# Patient Record
Sex: MALE | Race: WHITE | NOT HISPANIC OR LATINO | Employment: OTHER | URBAN - METROPOLITAN AREA
[De-identification: names, ages, dates, MRNs, and addresses within clinical notes are randomized per-mention and may not be internally consistent; named-entity substitution may affect disease eponyms.]

---

## 2019-07-23 ENCOUNTER — HOSPITAL ENCOUNTER (OUTPATIENT)
Dept: RADIOLOGY | Facility: MEDICAL CENTER | Age: 74
End: 2019-07-23

## 2019-07-24 ENCOUNTER — OFFICE VISIT (OUTPATIENT)
Dept: PULMONOLOGY | Facility: HOSPICE | Age: 74
End: 2019-07-24
Payer: MEDICARE

## 2019-07-24 VITALS
SYSTOLIC BLOOD PRESSURE: 90 MMHG | DIASTOLIC BLOOD PRESSURE: 60 MMHG | OXYGEN SATURATION: 93 % | RESPIRATION RATE: 16 BRPM | HEIGHT: 67 IN | HEART RATE: 54 BPM | BODY MASS INDEX: 32.49 KG/M2 | WEIGHT: 207 LBS | TEMPERATURE: 98.4 F

## 2019-07-24 DIAGNOSIS — R91.8 LUNG NODULES: ICD-10-CM

## 2019-07-24 PROCEDURE — 99204 OFFICE O/P NEW MOD 45 MIN: CPT | Performed by: INTERNAL MEDICINE

## 2019-07-24 RX ORDER — METOPROLOL SUCCINATE 50 MG/1
50 TABLET, EXTENDED RELEASE ORAL DAILY
Refills: 2 | COMMUNITY
Start: 2019-07-05

## 2019-07-24 RX ORDER — PAROXETINE HYDROCHLORIDE 20 MG/1
TABLET, FILM COATED ORAL
Refills: 3 | COMMUNITY
Start: 2019-07-05

## 2019-07-24 RX ORDER — LISINOPRIL 40 MG/1
TABLET ORAL
Refills: 2 | COMMUNITY
Start: 2019-07-05

## 2019-07-24 RX ORDER — OMEGA-3-ACID ETHYL ESTERS 1 G/1
CAPSULE, LIQUID FILLED ORAL
Refills: 2 | COMMUNITY
Start: 2019-07-05

## 2019-07-24 RX ORDER — ASPIRIN 325 MG
325 TABLET ORAL EVERY 6 HOURS PRN
COMMUNITY

## 2019-07-24 RX ORDER — FEXOFENADINE HCL 180 MG/1
TABLET ORAL
Refills: 3 | COMMUNITY
Start: 2019-07-05

## 2019-07-24 RX ORDER — LATANOPROST 50 UG/ML
SOLUTION/ DROPS OPHTHALMIC
Refills: 2 | COMMUNITY
Start: 2019-07-05

## 2019-07-24 RX ORDER — ROSUVASTATIN CALCIUM 20 MG/1
TABLET, COATED ORAL
Refills: 2 | COMMUNITY
Start: 2019-07-05

## 2019-07-24 ASSESSMENT — ENCOUNTER SYMPTOMS
WHEEZING: 0
WEAKNESS: 0
HEARTBURN: 0
MYALGIAS: 0
EYE DISCHARGE: 0
FALLS: 0
SHORTNESS OF BREATH: 0
PND: 0
FOCAL WEAKNESS: 0
HEMOPTYSIS: 0
CLAUDICATION: 0
ABDOMINAL PAIN: 0
DOUBLE VISION: 0
SPEECH CHANGE: 0
DIARRHEA: 0
NAUSEA: 0
SPUTUM PRODUCTION: 0
PHOTOPHOBIA: 0
CHILLS: 0
EYE PAIN: 0
SORE THROAT: 0
BLURRED VISION: 0
FEVER: 0
DIZZINESS: 0
TREMORS: 0
NECK PAIN: 0
SINUS PAIN: 0
ORTHOPNEA: 0
COUGH: 0
PALPITATIONS: 0
EYE REDNESS: 0
HEADACHES: 0
WEIGHT LOSS: 0
BACK PAIN: 0
DIAPHORESIS: 0
VOMITING: 0
CONSTIPATION: 0
STRIDOR: 0
DEPRESSION: 0

## 2019-07-24 NOTE — PROGRESS NOTES
Chief Complaint   Patient presents with   • Establish Care     referral 7/3/19 [facility] DX right middle lobe pulmonary nodule    • Results     CXR 7/1/19, Chest CT 7/2/19 [facility]        HPI: This patient is a 73 y.o. male presenting for evaluation of lung nodules.  The patient's past medical history significant for coronary artery disease status post coronary artery bypass graft roughly 24 years ago, major depressive disorder, hypertension, dyslipidemia.  The patient is a former tobacco user with less than 10-pack-year history and quit in 1980.  He drinks 1-2 drinks per month, no history of illicit drug use.  He did use chewing tobacco but quit this in 1980 as well.  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  He is retired for the past 25 years.  He denies travel other than to [place].  No pet birds at home or mold damage that the patient is aware of.  The patient was seen recently at [facility] near where he lives for acute onset amnesia.  There was concern that he was suffering a cerebrovascular accident and family took him to the emergency department where work-up included a chest x-ray.  He was kept overnight for evaluation including echocardiogram, MRI brain, bilateral carotid ultrasounds and observation.  The only abnormality found per wife was present with patient today was a left upper lobe infiltrate on chest x-ray.  The patient was asymptomatic from pulmonary standpoint however given abnormal finding a follow-up CT chest was ordered which showed a 2.4 x 1.3 irregularly-shaped left upper lobe masslike consolidation in addition to a 9 mm right upper lobe pleural-based medially located nodule.  There is also mildly enlarged bilateral hilar lymph nodes measuring up to 1.4 cm.  The patient denies cough, chest pain, fevers, chills, night sweats, weight loss.  He was  referred to us for diagnostic evaluation.    Past Medical History:   Diagnosis Date   • Chickenpox    • Coronary heart disease    • Depression    • Indonesian measles    • Heart attack (HCC)    • Mumps        Social History     Social History   • Marital status: Unknown     Spouse name: N/A   • Number of children: N/A   • Years of education: N/A     Occupational History   • Not on file.     Social History Main Topics   • Smoking status: Former Smoker     Packs/day: 0.50     Years: 10.00     Types: Cigarettes     Start date: 1970     Quit date: 1980   • Smokeless tobacco: Former User     Types: Chew     Quit date: 1980      Comment: tabacco chewer for 5 years    • Alcohol use Yes      Comment: occ   • Drug use: No   • Sexual activity: Not on file     Other Topics Concern   • Not on file     Social History Narrative   • No narrative on file       Family History   Problem Relation Age of Onset   • Cancer Mother    • No Known Problems Father    • Cancer Brother        No current outpatient prescriptions on file prior to visit.     No current facility-administered medications on file prior to visit.        Allergies: Patient has no known allergies.    ROS:   Review of Systems   Constitutional: Negative for chills, diaphoresis, fever, malaise/fatigue and weight loss.   HENT: Negative for congestion, ear discharge, ear pain, hearing loss, nosebleeds, sinus pain, sore throat and tinnitus.    Eyes: Negative for blurred vision, double vision, photophobia, pain, discharge and redness.   Respiratory: Negative for cough, hemoptysis, sputum production, shortness of breath, wheezing and stridor.    Cardiovascular: Negative for chest pain, palpitations, orthopnea, claudication, leg swelling and PND.   Gastrointestinal: Negative for abdominal pain, constipation, diarrhea, heartburn, nausea and vomiting.   Genitourinary: Negative for dysuria and urgency.   Musculoskeletal: Negative for back pain, falls, joint pain, myalgias and neck  "pain.   Skin: Negative for itching and rash.   Neurological: Negative for dizziness, tremors, speech change, focal weakness, weakness and headaches.   Endo/Heme/Allergies: Negative for environmental allergies.   Psychiatric/Behavioral: Negative for depression.       BP (!) 90/60 (BP Location: Left arm, Patient Position: Sitting, BP Cuff Size: Large adult)   Pulse (!) 54   Temp 36.9 °C (98.4 °F) (Temporal)   Resp 16   Ht 1.702 m (5' 7\")   Wt 93.9 kg (207 lb)   SpO2 93%     Physical Exam:  Physical Exam   Constitutional: He is oriented to person, place, and time. He appears well-developed and well-nourished. No distress.   HENT:   Head: Normocephalic and atraumatic.   Mouth/Throat: Oropharynx is clear and moist. No oropharyngeal exudate.   Eyes: Pupils are equal, round, and reactive to light. Conjunctivae and EOM are normal. No scleral icterus.   Neck: Normal range of motion. Neck supple. No tracheal deviation present.   Cardiovascular: Normal rate and regular rhythm.    Murmur heard.  II/VI systolic murmur LUSB   Pulmonary/Chest: Effort normal and breath sounds normal. No respiratory distress. He has no wheezes. He has no rales.   Abdominal: Soft.   Moderately obese   Musculoskeletal: Normal range of motion. He exhibits no edema or deformity.   Neurological: He is alert and oriented to person, place, and time. No cranial nerve deficit.   Skin: Skin is warm and dry. No rash noted.   Psychiatric: He has a normal mood and affect.       PFTs as reviewed by me personally: none    Imaging as reviewed by me personally: as per HPI    Assessment:  1. Lung nodules  [id number] BASE TO MID-THIGH    Bronchoscopy       Plan:  The size and characteristics w/associated LAD of UGO lesion is certainly concerning for primary lung Ca.  The RUL nodule is too medial for CT guided bx and the UGO nodule too central however I do believe it can be reached via navigational bronchoscopy and EBUS of hilar LNs can be done " simultaneously. I will order PET while we work to schedule diagnostic bx with bronch as detailed.  F/U with me pending scheduling above.   Return in about 4 weeks (around 8/21/2019) for lung nodule.

## 2019-07-25 ENCOUNTER — HOSPITAL ENCOUNTER (OUTPATIENT)
Dept: RADIOLOGY | Facility: MEDICAL CENTER | Age: 74
End: 2019-07-25
Attending: INTERNAL MEDICINE
Payer: MEDICARE

## 2019-07-25 ENCOUNTER — TELEPHONE (OUTPATIENT)
Dept: PULMONOLOGY | Facility: HOSPICE | Age: 74
End: 2019-07-25

## 2019-07-25 DIAGNOSIS — R91.8 LUNG NODULES: ICD-10-CM

## 2019-07-25 DIAGNOSIS — R91.1 LUNG NODULE: ICD-10-CM

## 2019-07-25 PROCEDURE — A9552 F18 FDG: HCPCS

## 2019-07-25 NOTE — TELEPHONE ENCOUNTER
[facility] gonzalez Reyna to call me back so we can go over dates for Veran + EBUS Bronchoscopy.  Dr. Connell is wanting me to try for august 8th or 9th. I will have to move patients for her this morning.   Waiting for authorization

## 2019-07-29 NOTE — TELEPHONE ENCOUNTER
Scheduled for 08/16/2019  Checking in at 1030 - CT scan will be at 11. Procedure will start at 1300.  Email sent to patient's wife with instructions

## 2019-08-15 DIAGNOSIS — Z01.810 PRE-OPERATIVE CARDIOVASCULAR EXAMINATION: ICD-10-CM

## 2019-08-15 DIAGNOSIS — Z01.812 PRE-OPERATIVE LABORATORY EXAMINATION: ICD-10-CM

## 2019-08-15 PROCEDURE — 80048 BASIC METABOLIC PNL TOTAL CA: CPT

## 2019-08-15 RX ORDER — GLUCOSAMINE SULFATE 500 MG
1 CAPSULE ORAL 2 TIMES DAILY
COMMUNITY

## 2019-08-15 RX ORDER — ZINC OXIDE 13 %
2 CREAM (GRAM) TOPICAL DAILY
COMMUNITY

## 2019-08-15 NOTE — OR NURSING
"Pre-admit appointment completed. \"Preparing for your procedure\" sheet given to pt along with verbal and written instructions. Pt instructed to continue regularly prescribed medications through the day before surgery. Pt instructed to take the following medications the day of surgery with a sip of water, per anesthesia protocol; metoprolol(if pt takes every am, he will verify at home)    MATHEUS education given due to score of 5 on MATHEUS screening tool.       "

## 2019-08-16 ENCOUNTER — APPOINTMENT (OUTPATIENT)
Dept: RADIOLOGY | Facility: MEDICAL CENTER | Age: 74
End: 2019-08-16
Attending: INTERNAL MEDICINE
Payer: MEDICARE

## 2019-08-16 ENCOUNTER — HOSPITAL ENCOUNTER (OUTPATIENT)
Facility: MEDICAL CENTER | Age: 74
End: 2019-08-16
Attending: INTERNAL MEDICINE | Admitting: INTERNAL MEDICINE
Payer: MEDICARE

## 2019-08-16 ENCOUNTER — ANESTHESIA (OUTPATIENT)
Dept: SURGERY | Facility: MEDICAL CENTER | Age: 74
End: 2019-08-16
Payer: MEDICARE

## 2019-08-16 ENCOUNTER — ANESTHESIA EVENT (OUTPATIENT)
Dept: SURGERY | Facility: MEDICAL CENTER | Age: 74
End: 2019-08-16
Payer: MEDICARE

## 2019-08-16 VITALS
SYSTOLIC BLOOD PRESSURE: 135 MMHG | OXYGEN SATURATION: 92 % | RESPIRATION RATE: 14 BRPM | HEART RATE: 59 BPM | WEIGHT: 210.54 LBS | DIASTOLIC BLOOD PRESSURE: 78 MMHG | BODY MASS INDEX: 33.04 KG/M2 | HEIGHT: 67 IN | TEMPERATURE: 97.7 F

## 2019-08-16 PROBLEM — I10 HYPERTENSION: Status: ACTIVE | Noted: 2019-08-16

## 2019-08-16 PROBLEM — I25.10 CORONARY HEART DISEASE: Status: ACTIVE | Noted: 2019-08-16

## 2019-08-16 PROBLEM — E78.00 HIGH CHOLESTEROL: Status: ACTIVE | Noted: 2019-08-16

## 2019-08-16 LAB
ANION GAP SERPL CALC-SCNC: 11 MMOL/L (ref 0–11.9)
BUN SERPL-MCNC: 16 MG/DL (ref 8–22)
CALCIUM SERPL-MCNC: 9.4 MG/DL (ref 8.5–10.5)
CHLORIDE SERPL-SCNC: 105 MMOL/L (ref 96–112)
CO2 SERPL-SCNC: 23 MMOL/L (ref 20–33)
CREAT SERPL-MCNC: 0.95 MG/DL (ref 0.5–1.4)
EKG IMPRESSION: NORMAL
GLUCOSE SERPL-MCNC: 102 MG/DL (ref 65–99)
PATHOLOGY CONSULT NOTE: NORMAL
PATHOLOGY CONSULT NOTE: NORMAL
POTASSIUM SERPL-SCNC: 4.1 MMOL/L (ref 3.6–5.5)
SODIUM SERPL-SCNC: 139 MMOL/L (ref 135–145)

## 2019-08-16 PROCEDURE — 160036 HCHG PACU - EA ADDL 30 MINS PHASE I: Performed by: INTERNAL MEDICINE

## 2019-08-16 PROCEDURE — 160002 HCHG RECOVERY MINUTES (STAT): Performed by: INTERNAL MEDICINE

## 2019-08-16 PROCEDURE — 88305 TISSUE EXAM BY PATHOLOGIST: CPT

## 2019-08-16 PROCEDURE — 160048 HCHG OR STATISTICAL LEVEL 1-5: Performed by: INTERNAL MEDICINE

## 2019-08-16 PROCEDURE — 700111 HCHG RX REV CODE 636 W/ 250 OVERRIDE (IP): Performed by: STUDENT IN AN ORGANIZED HEALTH CARE EDUCATION/TRAINING PROGRAM

## 2019-08-16 PROCEDURE — 700101 HCHG RX REV CODE 250: Performed by: STUDENT IN AN ORGANIZED HEALTH CARE EDUCATION/TRAINING PROGRAM

## 2019-08-16 PROCEDURE — 88172 CYTP DX EVAL FNA 1ST EA SITE: CPT | Mod: 91

## 2019-08-16 PROCEDURE — 93010 ELECTROCARDIOGRAM REPORT: CPT | Performed by: INTERNAL MEDICINE

## 2019-08-16 PROCEDURE — 71250 CT THORAX DX C-: CPT

## 2019-08-16 PROCEDURE — 31628 BRONCHOSCOPY/LUNG BX EACH: CPT | Performed by: INTERNAL MEDICINE

## 2019-08-16 PROCEDURE — 160025 RECOVERY II MINUTES (STATS): Performed by: INTERNAL MEDICINE

## 2019-08-16 PROCEDURE — 700105 HCHG RX REV CODE 258: Performed by: INTERNAL MEDICINE

## 2019-08-16 PROCEDURE — 160041 HCHG SURGERY MINUTES - EA ADDL 1 MIN LEVEL 4: Performed by: INTERNAL MEDICINE

## 2019-08-16 PROCEDURE — 302978 HCHG BRONCHOSCOPY-DIAGNOSTIC

## 2019-08-16 PROCEDURE — 31627 NAVIGATIONAL BRONCHOSCOPY: CPT | Performed by: INTERNAL MEDICINE

## 2019-08-16 PROCEDURE — 93005 ELECTROCARDIOGRAM TRACING: CPT | Performed by: INTERNAL MEDICINE

## 2019-08-16 PROCEDURE — 71045 X-RAY EXAM CHEST 1 VIEW: CPT

## 2019-08-16 PROCEDURE — 700101 HCHG RX REV CODE 250: Performed by: INTERNAL MEDICINE

## 2019-08-16 PROCEDURE — 88177 CYTP FNA EVAL EA ADDL: CPT | Mod: 91

## 2019-08-16 PROCEDURE — 88173 CYTOPATH EVAL FNA REPORT: CPT

## 2019-08-16 PROCEDURE — 160035 HCHG PACU - 1ST 60 MINS PHASE I: Performed by: INTERNAL MEDICINE

## 2019-08-16 PROCEDURE — 160029 HCHG SURGERY MINUTES - 1ST 30 MINS LEVEL 4: Performed by: INTERNAL MEDICINE

## 2019-08-16 PROCEDURE — 160009 HCHG ANES TIME/MIN: Performed by: INTERNAL MEDICINE

## 2019-08-16 PROCEDURE — 160046 HCHG PACU - 1ST 60 MINS PHASE II: Performed by: INTERNAL MEDICINE

## 2019-08-16 RX ORDER — OXYCODONE HCL 5 MG/5 ML
5 SOLUTION, ORAL ORAL
Status: DISCONTINUED | OUTPATIENT
Start: 2019-08-16 | End: 2019-08-16 | Stop reason: HOSPADM

## 2019-08-16 RX ORDER — SODIUM CHLORIDE, SODIUM LACTATE, POTASSIUM CHLORIDE, CALCIUM CHLORIDE 600; 310; 30; 20 MG/100ML; MG/100ML; MG/100ML; MG/100ML
INJECTION, SOLUTION INTRAVENOUS CONTINUOUS
Status: DISCONTINUED | OUTPATIENT
Start: 2019-08-16 | End: 2019-08-16 | Stop reason: HOSPADM

## 2019-08-16 RX ORDER — MEPERIDINE HYDROCHLORIDE 25 MG/ML
12.5 INJECTION INTRAMUSCULAR; INTRAVENOUS; SUBCUTANEOUS
Status: DISCONTINUED | OUTPATIENT
Start: 2019-08-16 | End: 2019-08-16 | Stop reason: HOSPADM

## 2019-08-16 RX ORDER — OXYCODONE HCL 5 MG/5 ML
10 SOLUTION, ORAL ORAL
Status: DISCONTINUED | OUTPATIENT
Start: 2019-08-16 | End: 2019-08-16 | Stop reason: HOSPADM

## 2019-08-16 RX ORDER — ONDANSETRON 2 MG/ML
4 INJECTION INTRAMUSCULAR; INTRAVENOUS
Status: DISCONTINUED | OUTPATIENT
Start: 2019-08-16 | End: 2019-08-16 | Stop reason: HOSPADM

## 2019-08-16 RX ORDER — LIDOCAINE HYDROCHLORIDE 20 MG/ML
JELLY TOPICAL PRN
Status: DISCONTINUED | OUTPATIENT
Start: 2019-08-16 | End: 2019-08-16 | Stop reason: SURG

## 2019-08-16 RX ORDER — HYDROMORPHONE HYDROCHLORIDE 2 MG/ML
0.4 INJECTION, SOLUTION INTRAMUSCULAR; INTRAVENOUS; SUBCUTANEOUS
Status: DISCONTINUED | OUTPATIENT
Start: 2019-08-16 | End: 2019-08-16 | Stop reason: HOSPADM

## 2019-08-16 RX ORDER — HALOPERIDOL 5 MG/ML
1 INJECTION INTRAMUSCULAR
Status: DISCONTINUED | OUTPATIENT
Start: 2019-08-16 | End: 2019-08-16 | Stop reason: HOSPADM

## 2019-08-16 RX ORDER — ONDANSETRON 2 MG/ML
INJECTION INTRAMUSCULAR; INTRAVENOUS PRN
Status: DISCONTINUED | OUTPATIENT
Start: 2019-08-16 | End: 2019-08-16 | Stop reason: SURG

## 2019-08-16 RX ORDER — HYDROMORPHONE HYDROCHLORIDE 2 MG/ML
0.1 INJECTION, SOLUTION INTRAMUSCULAR; INTRAVENOUS; SUBCUTANEOUS
Status: DISCONTINUED | OUTPATIENT
Start: 2019-08-16 | End: 2019-08-16 | Stop reason: HOSPADM

## 2019-08-16 RX ORDER — HYDROMORPHONE HYDROCHLORIDE 2 MG/ML
0.2 INJECTION, SOLUTION INTRAMUSCULAR; INTRAVENOUS; SUBCUTANEOUS
Status: DISCONTINUED | OUTPATIENT
Start: 2019-08-16 | End: 2019-08-16 | Stop reason: HOSPADM

## 2019-08-16 RX ADMIN — LIDOCAINE HYDROCHLORIDE 5 ML: 20 JELLY TOPICAL at 13:09

## 2019-08-16 RX ADMIN — ONDANSETRON 4 MG: 2 INJECTION INTRAMUSCULAR; INTRAVENOUS at 14:13

## 2019-08-16 RX ADMIN — LIDOCAINE HYDROCHLORIDE 0.5 ML: 10 INJECTION, SOLUTION INFILTRATION; PERINEURAL at 11:02

## 2019-08-16 RX ADMIN — SUGAMMADEX 200 MG: 100 INJECTION, SOLUTION INTRAVENOUS at 14:10

## 2019-08-16 RX ADMIN — EPHEDRINE SULFATE 10 MG: 50 INJECTION INTRAMUSCULAR; INTRAVENOUS; SUBCUTANEOUS at 14:06

## 2019-08-16 RX ADMIN — SUCCINYLCHOLINE CHLORIDE 100 MG: 20 INJECTION, SOLUTION INTRAMUSCULAR; INTRAVENOUS at 13:09

## 2019-08-16 RX ADMIN — SODIUM CHLORIDE, POTASSIUM CHLORIDE, SODIUM LACTATE AND CALCIUM CHLORIDE: 600; 310; 30; 20 INJECTION, SOLUTION INTRAVENOUS at 11:02

## 2019-08-16 RX ADMIN — ROCURONIUM BROMIDE 5 MG: 10 INJECTION INTRAVENOUS at 13:09

## 2019-08-16 RX ADMIN — FENTANYL CITRATE 100 MCG: 50 INJECTION, SOLUTION INTRAMUSCULAR; INTRAVENOUS at 13:09

## 2019-08-16 RX ADMIN — PROPOFOL 200 MG: 10 INJECTION, EMULSION INTRAVENOUS at 13:09

## 2019-08-16 NOTE — OR NURSING
1418 To PACU from special procedures room  via gurney, sleeping, respirations spontaneous and non-labored via OPA. OPA dc'd upon arrival by Dr. Perez. Pts VSS. BP elevated, Dr. Perez aware.  1430 Pts VSS. Pt waking up more at this time    1445 Updated pts wife via phone. Pt denies pain and nausea at this time. VSS  1455 X ray at bedside for chest x ray.   1500 Pt denies pain and nausea. VSS. Pt A&O. Awaiting chest x ray to be read to discharge pt   1515 No change   1530 Pt meets criteria for stage two. VSS. Pt denies pain and nausea

## 2019-08-16 NOTE — DISCHARGE INSTRUCTIONS
Bronchoscopy Discharge Instructions  Home Care Instructions    ACTIVITY: Rest and take it easy for the first 24 hours.  A responsible adult is recommended to remain with you during that time.  It is normal to feel sleepy.  We encourage you to not do anything that requires balance, judgment or coordination.    The medicine you had during the bronchoscopy will make you sleepy.    FOR 24 HOURS DO NOT:  Drive, operate machinery or run household appliances.  Drink beer or alcoholic beverages.  Make important decisions or sign legal documents.  Engage in activity that requires sharp judgment and reflexes for 24 hours    SPECIAL INSTRUCTIONS: Call you doctor and go to the ER if you are coughing up more than 2 tablespoons of bright red blood. -Call your doctor and go to the ER if you experience acute onset of shortness of breath and/or increased chest pain. -Call your doctor and go to the ER if you develop a fever greater than 101.5 degrees Fahrenheit.    Bronchoscopy is a procedure to look inside your windpipe and bronchial tubes.  An anesthetic solution is sprayed in your throat to make it numb.    You may experience a mild sore throat, hoarseness, fever up to 101?F, and /or coughing up small amounts of blood immediately following your bronchoscopy, especially if a biopsy was performed.  The discomfort should subside in 24-48 hours.    Do not smoke for 6-8 hours after the procedure to decrease your risk of coughing and /or bleeding.    Do not drink fluids or eat until your gag reflex returns, for two hours after the bronchoscopy.  Otherwise you will not feel the food or fluid in your throat, and it may go down your windpipe and cause you to choke.    Take ice chips or slowly sip cool fluids to make sure your gag reflex has returned.  Avoid hot fluids from the microwave for several hours.    After 2 hours or when you get home you may take throat lozenges or gargle with salt water if your throat is sore.  Drink  liquids to help dryness in your mouth and throat.    Resume your normal activities the following day.    MEDICATIONS: Resume taking daily medication as directed by your doctor.  Other Medications: None     A follow-up appointment should be arranged with your doctor in 1 week to get the results of the bronchoscopy and any tests done during the procedure; call to schedule.      You should CALL YOUR PHYSICIAN if you develop:  Fever greater than 101?F  You cough up more than a teaspoon of blood other than blood-tinged mucus  You have increasing amounts of bleeding from coughing after the bronchoscopy  You are wheezing  You develop any unusual signs or symptoms or have any questions                You should call 911 if you develop problems with breathing or chest pain.    If you are unable to contact your doctor or surgical center, you should go to the nearest emergency room or urgent care center.      Physician's telephone #: Dr. Connell [phone]      If any questions arise, call your doctor.  If your doctor is not available, please feel free to call the Surgical Center at [phone].  The Center is open Monday through Friday from 7AM to 7PM.  You can also call the [employer] HOTLINE open 24 hours/day, 7 days/week and speak to a nurse at [phone], or toll free at [phone].    You may receive a survey in the mail within the next two weeks and we ask that you take a few moments to complete the survey and return it to us.  Our goal is to provide you with very good care and we value your comments.

## 2019-08-16 NOTE — ANESTHESIA QCDR
2019 [facility] Clinical Data Registry (for Quality Improvement)     Postoperative nausea/vomiting risk protocol (Adult = 18 yrs and Pediatric 3-17 yrs)- (430 and 463)  General inhalation anesthetic (NOT TIVA) with PONV risk factors: Yes  Provision of anti-emetic therapy with at least 2 different classes of agents: No   Patient DID NOT receive anti-emetic therapy and reason is documented in Medical Record:        Multimodal Pain Management- (AQI59)  Patient undergoing Elective Surgery (i.e. Outpatient, or ASC, or Prescheduled Surgery prior to Hospital Admission): Yes  Use of Multimodal Pain Management, two or more drugs and/or interventions, NOT including systemic opioids: No   Exception: Documented allergy to multiple classes of analgesics: No        PACU assessment of acute postoperative pain prior to Anesthesia Care End- Applies to Patients Age = 18- (ABG7)  Initial PACU pain score is which of the following: < 7/10  Patient unable to report pain score: N/A    Post-anesthetic transfer of care checklist/protocol to PACU/ICU- (426 and 427)  Upon conclusion of case, patient transferred to which of the following locations: PACU/Non-ICU  Use of transfer checklist/protocol: Yes  Exclusion: Service Performed in Patient Hospital Room (and thus did not require transfer): N/A    PACU Reintubation- (AQI31)  General anesthesia requiring endotracheal intubation (ETT) along with subsequent extubation in OR or PACU: Yes  Required reintubation in the PACU: No   Extubation was a planned trial documented in the medical record prior to removal of the original airway device:  N/A    Unplanned admission to ICU related to anesthesia service up through end of PACU care- (MD51)  Unplanned admission to ICU (not initially anticipated at anesthesia start time): No

## 2019-08-16 NOTE — OR NURSING
1635-Pt arrived STG2 recovery. Assisted to dress and into recliner. Tolerated well. Denies pain or nausea at present. VSS.   Family to chairside.    D/c instructions given with good understanding.     NAD  Pt d/cd to family via WC.    Pt ambulatory to BR, + U/O.

## 2019-08-16 NOTE — PROCEDURES
Bronchoscopy    Indication: UGO lesion    Informed consent obtained including and not limited to pneumothorax, bleeding, infection, arrythmias, cardiac arrest and non diagnostic procedure    Medication: See Dr. Perez's note re: general anesthesia    Time out obtained before beginning of procedure    Procedure:Navigational bronchoscopy    Bronchoscope was passed via ET tube  Vocal cords: did not viaualize  Ania:sharp  Trachea:normal  UGO, lingula, LLL, RUL, RML, RLL all level one subsegments visualized no endobronchial lesions seen  Via navigational bronchoscopy 6 forcep bx done and 3 brush samplings done  Brush slide suggestive of atypical mitotic cells    EBL < 5 ml    No acute complications    CXR pending      Impression  UGO lesion 2.1 cm with SUV of 2.5  R/o maligancy   Awaiting final

## 2019-08-16 NOTE — ANESTHESIA TIME REPORT
Anesthesia Start and Stop Event Times     Date Time Event    8/16/2019 1250 Ready for Procedure     1300 Anesthesia Start     1419 Anesthesia Stop        Responsible Staff  08/16/19    Name Role Begin End    Kirt Perez M.D. Anesth 1300 1419        Preop Diagnosis (Free Text):  Pre-op Diagnosis     LUNG NODULES        Preop Diagnosis (Codes):  Diagnosis Information     Diagnosis Code(s): Lung nodules [R91.8]        Post op Diagnosis  Lung nodule      Premium Reason  Non-Premium    Comments:

## 2019-08-16 NOTE — ANESTHESIA POSTPROCEDURE EVALUATION
Patient: Axel Cuevas    Procedure Summary     Date:  08/16/19 Room / Location:   PROCEDURE ROOM / SURGERY [facility]    Anesthesia Start:  1300 Anesthesia Stop:  1419    Procedures:       BRONCHOSCOPY - FIBEROPTIC, POSS WASH, BRUSH, BRONCHOALVEOLAR LAVAGE, BIOPSY, FNA      ENDOBRONCHIAL ULTRASOUND (EBUS) - AHSAN Diagnosis:       Lung nodules      (LUNG NODULES)    Surgeon:  Marco A Arthur M.D. Responsible Provider:  Kirt Perez M.D.    Anesthesia Type:  general ASA Status:  3          Final Anesthesia Type: general  Last vitals  BP   Blood Pressure : 135/78    Temp   36.5 °C (97.7 °F)    Pulse   Pulse: (!) 59, Heart Rate (Monitored): (!) 58   Resp   14    SpO2   92 %      Anesthesia Post Evaluation    Patient location during evaluation: PACU  Patient participation: complete - patient participated  Level of consciousness: awake and alert    Airway patency: patent  Anesthetic complications: no  Cardiovascular status: hemodynamically stable  Respiratory status: acceptable  Hydration status: euvolemic    PONV: none           Nurse Pain Score: 0 (NPRS)

## 2019-08-16 NOTE — ANESTHESIA PREPROCEDURE EVALUATION
Relevant Problems   CARDIAC   (+) Coronary heart disease   (+) Heart attack (HCC)   (+) Hypertension       Physical Exam    Airway   Mallampati: II  TM distance: >3 FB  Neck ROM: full       Cardiovascular - normal exam  Rhythm: regular  Rate: normal  (-) murmur     Dental - normal exam         Pulmonary - normal exam  Breath sounds clear to auscultation     Abdominal    Neurological - normal exam                 Anesthesia Plan    ASA 3   ASA physical status 3 criteria: CAD/stents (> 3 months)    Plan - general       Airway plan will be ETT        Induction: intravenous    Postoperative Plan: Postoperative administration of opioids is intended.    Pertinent diagnostic labs and testing reviewed    Informed Consent:    Anesthetic plan and risks discussed with patient.    Use of blood products discussed with: patient whom consented to blood products.

## 2019-08-16 NOTE — OR NURSING
Patient to preop, allergies and NPO status verified, home medications reconciled, belongings secured, verbalizes understanding of pain scale, surgical site verified, IV access established, nasal and oral of triple aim completed.  1100: Patient taken to CT.  1138: Back from CT.

## 2019-08-16 NOTE — ANESTHESIA PROCEDURE NOTES
Airway  Date/Time: 8/16/2019 1:13 PM  Performed by: Kirt Perez M.D.  Authorized by: Kirt Perez M.D.     Location:  OR  Urgency:  Elective  Indications for Airway Management:  Anesthesia  Spontaneous Ventilation: absent    Sedation Level:  Deep  Preoxygenated: Yes    Patient Position:  Sniffing  Final Airway Type:  Endotracheal airway  Final Endotracheal Airway:  ETT  Cuffed: Yes    Technique Used for Successful ETT Placement:  Direct laryngoscopy  Insertion Site:  Oral  Blade Type:  Alverto  Laryngoscope Blade/Videolaryngoscope Blade Size:  4  ETT Size (mm):  8.5  Measured from:  Teeth  ETT to Teeth (cm):  24  Placement Verified by: auscultation and capnometry    Cormack-Lehane Classification:  Grade I - full view of glottis  Number of Attempts at Approach:  2   Attempt 1 with Mac4, grade III view, switched to glidescope, ATI.  Easy mask.

## 2019-08-20 ENCOUNTER — TELEPHONE (OUTPATIENT)
Dept: PULMONOLOGY | Facility: HOSPICE | Age: 74
End: 2019-08-20

## 2019-08-20 NOTE — TELEPHONE ENCOUNTER
"Called patient and wife to review results below. No malignancy  Some atypia  Reviewed the options to discuss with Dr. Hernandez  Repeat CT in 3 months versus IR guided bx verus   There was anthracosis and inflammation but also some atypia and this may be early malignancy  Wife understands and they are seeing Dr. David kerr    \"FINAL DIAGNOSIS:    A. Slides, left upper lobe:         No evidence of malignancy.         The smears show numerous cohesive groups of benign ciliated          columnar epithelium. There is background blood with associated          white blood cells.         No atypical or malignant cells are seen.  B. Left upper lobe biopsy:         Several small fragments of benign lung tissue showing focal          alveolar parenchyma and bronchial type ciliated columnar          epithelium with subjacent fibromuscular stromal tissue. Within          the alveolar parenchyma there is a small circumscribed focus of          anthracotic pigmented histiocytes. A few tiny polarizable          crystals are noted within this focus. A small detached fragment          of benign ciliated columnar epithelium with submucosal          fibromuscular tissue showing mild chronic inflammation and a          few neutrophils and eosinophils is noted.         No granulomatous inflammation is seen.         No malignancy is seen.  C. Brushings, left upper lobe (slides):         No evidence of malignancy.         The smears demonstrate several cohesive groups of benign          epithelial cells demonstrating focal mild cytologic atypia. One          cell group shows an associated mitotic figure. The smears also          demonstrate abundant background blood with associated white          blood cells.         No malignant cells are seen.  D. Brush head:         Brush head (gross only).         See comment.    Comment: The left upper lobe brushings demonstrate several cohesive  groups of benign epithelial cells showing focal mild " cytologic atypia,  which may represent reactive changes. No malignant cells are seen.  Pertinent slides from the left upper lobe aspirate smears, brushings  and biopsy are reviewed with Dr. Cadena with agreement on the  interpretation. Pertinent slides from the left upper lobe brushings are  also reviewed with Dr. Mcconnell and Dr. Garcia with agreement on the  interpretation.

## 2019-08-27 ENCOUNTER — OFFICE VISIT (OUTPATIENT)
Dept: PULMONOLOGY | Facility: HOSPICE | Age: 74
End: 2019-08-27
Payer: MEDICARE

## 2019-08-27 VITALS
BODY MASS INDEX: 32.8 KG/M2 | TEMPERATURE: 98.8 F | HEART RATE: 59 BPM | SYSTOLIC BLOOD PRESSURE: 122 MMHG | OXYGEN SATURATION: 95 % | DIASTOLIC BLOOD PRESSURE: 74 MMHG | HEIGHT: 67 IN | WEIGHT: 209 LBS | RESPIRATION RATE: 16 BRPM

## 2019-08-27 DIAGNOSIS — R91.8 LUNG NODULES: ICD-10-CM

## 2019-08-27 PROCEDURE — 99214 OFFICE O/P EST MOD 30 MIN: CPT | Performed by: INTERNAL MEDICINE

## 2019-08-27 ASSESSMENT — ENCOUNTER SYMPTOMS
EYE REDNESS: 0
WEAKNESS: 0
FALLS: 0
WEIGHT LOSS: 0
COUGH: 0
HEARTBURN: 0
CONSTIPATION: 0
HEMOPTYSIS: 0
BLURRED VISION: 0
NAUSEA: 0
DOUBLE VISION: 0
PALPITATIONS: 0
SPUTUM PRODUCTION: 0
EYE PAIN: 0
SPEECH CHANGE: 0
DIARRHEA: 0
HEADACHES: 0
DEPRESSION: 0
TREMORS: 0
SHORTNESS OF BREATH: 0
DIAPHORESIS: 0
FOCAL WEAKNESS: 0
FEVER: 0
DIZZINESS: 0
WHEEZING: 0
PHOTOPHOBIA: 0
SINUS PAIN: 0
EYE DISCHARGE: 0
BACK PAIN: 0
ORTHOPNEA: 0
PND: 0
STRIDOR: 0
CHILLS: 0
VOMITING: 0
CLAUDICATION: 0
ABDOMINAL PAIN: 0
SORE THROAT: 0
MYALGIAS: 0
NECK PAIN: 0

## 2019-08-27 NOTE — PROGRESS NOTES
Chief Complaint   Patient presents with   • Pulmonary Nodule     last seen 7/14/19    • Results     Ct thorax 8/16/19, Ct PET 7/25/19, CXR 8/16/19, BX 8/16/19          HPI: This patient is a 74 y.o. male whom is followed in our clinic for pulmonary nodules last seen by me on 7/24/19.  The patient's past medical history significant for coronary artery disease status post coronary artery bypass graft roughly 24 years ago, major depressive disorder, hypertension, dyslipidemia.  The patient is a former tobacco user with less than 10-pack-year history and quit in 1980.   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  He is retired for the past 25 years. The patient was referred to me after being seen  at [facility] where he lives for acute onset amnesia.  There was concern that he was suffering a cerebrovascular accident and family took him to the emergency department where work-up included a chest x-ray.  He was kept overnight for evaluation including echocardiogram, MRI brain, bilateral carotid ultrasounds and observation.  The only abnormality found was a left upper lobe infiltrate on chest x-ray.  The patient was asymptomatic from pulmonary standpoint however given abnormal finding a follow-up CT chest was ordered which showed a 2.4 x 1.3 irregularly-shaped left upper lobe masslike consolidation in addition to a 9 mm right upper lobe pleural-based medially located nodule.  There is also mildly enlarged bilateral hilar lymph nodes measuring up to 1.4 cm. A PET showed low grade FDG uptake in lymph nodes and to a lesser extent UGO lesion. No uptake in R nodule. Pt underwent navigational bronch with bx which showed some atypical cells but no malignancy. He presents today for f/u. No f/c, no chest pain, no nt sweats/wt loss. No cough or SOB.     Past Medical History:   Diagnosis Date   • Chickenpox    •  Coronary heart disease    • Depression    • Vietnamese measles    • Glaucoma     Bilateral eyes   • Heart attack (HCC)     CABG. Cardiology is Dr Garsia with [facility]   • High cholesterol    • Hypertension    • Lung nodule seen on imaging study    • Mumps    • Stroke (HCC) 2019    TIA-Negative work up and no residual       Social History     Socioeconomic History   • Marital status:      Spouse name: Not on file   • Number of children: Not on file   • Years of education: Not on file   • Highest education level: Not on file   Occupational History   • Not on file   Social Needs   • Financial resource strain: Not on file   • Food insecurity:     Worry: Not on file     Inability: Not on file   • Transportation needs:     Medical: Not on file     Non-medical: Not on file   Tobacco Use   • Smoking status: Former Smoker     Packs/day: 0.20     Years: 10.00     Pack years: 2.00     Types: Cigarettes     Start date:      Last attempt to quit:      Years since quittin.6   • Smokeless tobacco: Former User     Types: Chew     Quit date:    • Tobacco comment: tabacco chewer for 5 years    Substance and Sexual Activity   • Alcohol use: Not Currently   • Drug use: No   • Sexual activity: Not on file   Lifestyle   • Physical activity:     Days per week: Not on file     Minutes per session: Not on file   • Stress: Not on file   Relationships   • Social connections:     Talks on phone: Not on file     Gets together: Not on file     Attends Zoroastrian service: Not on file     Active member of club or organization: Not on file     Attends meetings of clubs or organizations: Not on file     Relationship status: Not on file   • Intimate partner violence:     Fear of current or ex partner: Not on file     Emotionally abused: Not on file     Physically abused: Not on file     Forced sexual activity: Not on file   Other Topics Concern   • Not on file   Social History Narrative   • Not on file        Family History   Problem Relation Age of Onset   • Cancer Mother    • No Known Problems Father    • Cancer Brother        Current Outpatient Medications on File Prior to Visit   Medication Sig Dispense Refill   • Probiotic Product (PROBIOTIC DAILY) Cap Take 2 Caps by mouth every day.     • glucosamine 500 MG Cap Take 1 Cap by mouth 2 Times a Day.     • latanoprost (XALATAN) 0.005 % Solution PUT 1 DROP IN BOTH EYES AT BEDTIME FOR GLAUCOMA  2   • lisinopril (PRINIVIL, ZESTRIL) 40 MG tablet TAKE ONE (1) TABLET EVERY DAY FOR HIGH BLOOD PRESSURE AND KIDNEYS  2   • metoprolol SR (TOPROL XL) 50 MG TABLET SR 24 HR Take 50 mg by mouth every day.  2   • omega-3 acid ethyl esters (LOVAZA) 1 GM capsule TAKE ONE (1) CAPSULE BY MOUTH TWICE A DAY  2   • PARoxetine (PAXIL) 20 MG Tab TAKE ONE (1) TABLET BY MOUTH EVERY DAY FOR DEPRESSION  3   • rosuvastatin (CRESTOR) 20 MG Tab TAKE ONE (1) TABLET BY MOUTH AT BEDTIME FOR HIGH CHOLESTEROL LEVELS  2   • fexofenadine (ALLEGRA) 180 MG tablet TAKE ONE (1) TABLET BY MOUTH EVERY DAY FOR SEASONAL ALLERGIC RHINITIS  3   • aspirin (ASA) 325 MG Tab Take 325 mg by mouth every 6 hours as needed.       No current facility-administered medications on file prior to visit.        Patient has no known allergies.      ROS:   Review of Systems   Constitutional: Negative for chills, diaphoresis, fever, malaise/fatigue and weight loss.   HENT: Negative for congestion, ear discharge, ear pain, hearing loss, nosebleeds, sinus pain, sore throat and tinnitus.    Eyes: Negative for blurred vision, double vision, photophobia, pain, discharge and redness.   Respiratory: Negative for cough, hemoptysis, sputum production, shortness of breath, wheezing and stridor.    Cardiovascular: Negative for chest pain, palpitations, orthopnea, claudication, leg swelling and PND.   Gastrointestinal: Negative for abdominal pain, constipation, diarrhea, heartburn, nausea and vomiting.   Genitourinary: Negative for dysuria  "and urgency.   Musculoskeletal: Negative for back pain, falls, joint pain, myalgias and neck pain.   Skin: Negative for itching and rash.   Neurological: Negative for dizziness, tremors, speech change, focal weakness, weakness and headaches.   Endo/Heme/Allergies: Negative for environmental allergies.   Psychiatric/Behavioral: Negative for depression.       /74 (BP Location: Left arm, Patient Position: Sitting, BP Cuff Size: Adult)   Pulse (!) 59   Temp 37.1 °C (98.8 °F) (Temporal)   Resp 16   Ht 1.702 m (5' 7\")   Wt 94.8 kg (209 lb)   SpO2 95%   Physical Exam   Constitutional: He is oriented to person, place, and time. He appears well-developed and well-nourished. No distress.   HENT:   Head: Normocephalic and atraumatic.   Right Ear: External ear normal.   Left Ear: External ear normal.   Mouth/Throat: Oropharynx is clear and moist. No oropharyngeal exudate.   Eyes: Pupils are equal, round, and reactive to light. Conjunctivae and EOM are normal. No scleral icterus.   Neck: Normal range of motion. Neck supple. No tracheal deviation present.   Cardiovascular: Normal rate, regular rhythm and normal heart sounds. Exam reveals no gallop and no friction rub.   No murmur heard.  Pulmonary/Chest: Effort normal and breath sounds normal. No stridor. No respiratory distress. He has no wheezes.   Abdominal: Soft. He exhibits no distension. There is no tenderness.   Musculoskeletal: Normal range of motion. He exhibits no edema or deformity.   Neurological: He is alert and oriented to person, place, and time. No cranial nerve deficit.   Skin: Skin is warm and dry. No rash noted.   Psychiatric: He has a normal mood and affect.       Imaging as reviewed by me personally:  As per HPI    Assessment:  1. Lung nodules  CT-CHEST (THORAX) W/O       Plan:  No e/o malignancy from bronchoscopic bx. While sampling error is still a possibility, even if he does have an underlying malignancy it would be low-grade given minimal FDG " uptake.  Alternative possible etiologies include inflammatory process such as sarcoidosis or chronic, low-grade infection such as fungal or NTM.  Either way he has no symptoms that would prompt aggressive work-up for treatment.  We did discuss the options including excisional biopsy, CT-guided biopsy versus repeat imaging in the next 3 months.  Patient has opted to repeat CT chest in the next 3 months or sooner if symptoms develop.  Return in about 3 months (around 11/27/2019) for ct c hest.

## 2019-11-19 ENCOUNTER — TELEPHONE (OUTPATIENT)
Dept: PULMONOLOGY | Facility: HOSPICE | Age: 74
End: 2019-11-19

## 2019-11-20 NOTE — TELEPHONE ENCOUNTER
Pt is scheduled to see Dr. Hernandez 11/21/19. Per last OV Ct Chest was ordered to complete. No results in the chart     Attempt to contact the patient, phone was answered and was hung up on.   2 nd attempt LVM informed CT Chest order at pervious appt. Encouraged to complete if not completed and if completed else where encouraged to call and inform us.

## 2019-11-21 ENCOUNTER — HOSPITAL ENCOUNTER (OUTPATIENT)
Dept: RADIOLOGY | Facility: MEDICAL CENTER | Age: 74
End: 2019-11-21
Attending: INTERNAL MEDICINE
Payer: MEDICARE

## 2019-11-21 ENCOUNTER — OFFICE VISIT (OUTPATIENT)
Dept: PULMONOLOGY | Facility: HOSPICE | Age: 74
End: 2019-11-21
Payer: MEDICARE

## 2019-11-21 VITALS
OXYGEN SATURATION: 96 % | BODY MASS INDEX: 32.65 KG/M2 | SYSTOLIC BLOOD PRESSURE: 116 MMHG | WEIGHT: 208 LBS | HEIGHT: 67 IN | RESPIRATION RATE: 16 BRPM | DIASTOLIC BLOOD PRESSURE: 66 MMHG | TEMPERATURE: 97.5 F | HEART RATE: 50 BPM

## 2019-11-21 DIAGNOSIS — Z23 NEED FOR VACCINATION: ICD-10-CM

## 2019-11-21 DIAGNOSIS — R91.8 LUNG NODULES: ICD-10-CM

## 2019-11-21 PROCEDURE — 71250 CT THORAX DX C-: CPT

## 2019-11-21 PROCEDURE — G0009 ADMIN PNEUMOCOCCAL VACCINE: HCPCS | Performed by: INTERNAL MEDICINE

## 2019-11-21 PROCEDURE — 90670 PCV13 VACCINE IM: CPT | Performed by: INTERNAL MEDICINE

## 2019-11-21 PROCEDURE — 99213 OFFICE O/P EST LOW 20 MIN: CPT | Mod: 25 | Performed by: INTERNAL MEDICINE

## 2019-11-21 ASSESSMENT — ENCOUNTER SYMPTOMS
ABDOMINAL PAIN: 0
CONSTIPATION: 0
SINUS PAIN: 0
CHILLS: 0
EYE DISCHARGE: 0
BACK PAIN: 0
STRIDOR: 0
DIARRHEA: 0
MYALGIAS: 0
PALPITATIONS: 0
WEIGHT LOSS: 0
EYE REDNESS: 0
FEVER: 0
SPUTUM PRODUCTION: 0
NAUSEA: 0
BLURRED VISION: 0
HEMOPTYSIS: 0
FALLS: 0
DIZZINESS: 0
FOCAL WEAKNESS: 0
DOUBLE VISION: 0
PHOTOPHOBIA: 0
COUGH: 0
VOMITING: 0
DIAPHORESIS: 0
NECK PAIN: 0
HEADACHES: 0
SPEECH CHANGE: 0
WEAKNESS: 0
CLAUDICATION: 0
PND: 0
ORTHOPNEA: 0
EYE PAIN: 0
WHEEZING: 0
SORE THROAT: 0
SHORTNESS OF BREATH: 0
DEPRESSION: 0
TREMORS: 0
HEARTBURN: 0

## 2019-11-21 NOTE — PROGRESS NOTES
Chief Complaint   Patient presents with   • Pulmonary Nodule     LAST SEEN 8/27/19   • Results     Ct Chest thorax 11/21/19         HPI: This patient is a 74 y.o. male whom is followed in our clinic for pulmonary nodules last seen by me on 8/27/19.  The patient's past medical history significant for coronary artery disease status post coronary artery bypass graft roughly 24 years ago, major depressive disorder, hypertension, dyslipidemia.  The patient is a former tobacco user with less than 10-pack-year history and quit in 1980.   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  He is retired for the past 25 years.  The patient was initially referred to me in July for incidental finding of left upper lobe nodule on chest x-ray during a hospitalization for acute amnestic event and concern for stroke.  A follow-up CT chest was ordered and demonstrated 2.4 x 1.3 irregularly-shaped left upper lobe masslike consolidation in addition to a 9 mm right upper lobe pleural-based nodule.  He had mildly enlarged bilateral hilar lymph nodes measuring up to 1.4 cm in size.  A CT PET was ordered and demonstrated low-grade FDG uptake in lymph nodes and to a lesser extent the left upper lobe lesion.  No uptake in his right upper lobe nodule.  He did undergo navigational bronchoscopy with biopsy which showed some atypical cells but no malignant cells.  No evidence of infection.  I saw him in follow-up on August 27 at which point we discussed further options such as excisional biopsy, CT-guided biopsy and continued observation with surveillance imaging.  Patient opted for surveillance imaging and presents today with repeat CT chest 3 months following his last imaging.  He has persistent left upper lobe nodule roughly 2 cm in size which is grossly unchanged.  His right upper lobe nodule is also unchanged.  There is no new nodules or  enlarging lymphadenopathy.  The patient remains asymptomatic from pulmonary standpoint.  No fevers, chills, night sweats, weight loss.  No chest pain, no cough.    Past Medical History:   Diagnosis Date   • Chickenpox    • Coronary heart disease    • Depression    • Estonian measles    • Glaucoma     Bilateral eyes   • Heart attack (HCC)     CABG. Cardiology is Dr Garsia with [facility]   • High cholesterol    • Hypertension    • Lung nodule seen on imaging study    • Mumps    • Stroke (HCC) 2019    TIA-Negative work up and no residual       Social History     Socioeconomic History   • Marital status:      Spouse name: Not on file   • Number of children: Not on file   • Years of education: Not on file   • Highest education level: Not on file   Occupational History   • Not on file   Social Needs   • Financial resource strain: Not on file   • Food insecurity:     Worry: Not on file     Inability: Not on file   • Transportation needs:     Medical: Not on file     Non-medical: Not on file   Tobacco Use   • Smoking status: Former Smoker     Packs/day: 0.20     Years: 10.00     Pack years: 2.00     Types: Cigarettes     Start date:      Last attempt to quit:      Years since quittin.9   • Smokeless tobacco: Former User     Types: Chew     Quit date:    • Tobacco comment: tabacco chewer for 5 years    Substance and Sexual Activity   • Alcohol use: Not Currently   • Drug use: No   • Sexual activity: Not on file   Lifestyle   • Physical activity:     Days per week: Not on file     Minutes per session: Not on file   • Stress: Not on file   Relationships   • Social connections:     Talks on phone: Not on file     Gets together: Not on file     Attends Evangelical service: Not on file     Active member of club or organization: Not on file     Attends meetings of clubs or organizations: Not on file     Relationship status: Not on file   • Intimate partner violence:     Fear of current  or ex partner: Not on file     Emotionally abused: Not on file     Physically abused: Not on file     Forced sexual activity: Not on file   Other Topics Concern   • Not on file   Social History Narrative   • Not on file       Family History   Problem Relation Age of Onset   • Cancer Mother    • No Known Problems Father    • Cancer Brother        Current Outpatient Medications on File Prior to Visit   Medication Sig Dispense Refill   • Probiotic Product (PROBIOTIC DAILY) Cap Take 2 Caps by mouth every day.     • glucosamine 500 MG Cap Take 1 Cap by mouth 2 Times a Day.     • latanoprost (XALATAN) 0.005 % Solution PUT 1 DROP IN BOTH EYES AT BEDTIME FOR GLAUCOMA  2   • lisinopril (PRINIVIL, ZESTRIL) 40 MG tablet TAKE ONE (1) TABLET EVERY DAY FOR HIGH BLOOD PRESSURE AND KIDNEYS  2   • metoprolol SR (TOPROL XL) 50 MG TABLET SR 24 HR Take 50 mg by mouth every day.  2   • omega-3 acid ethyl esters (LOVAZA) 1 GM capsule TAKE ONE (1) CAPSULE BY MOUTH TWICE A DAY  2   • PARoxetine (PAXIL) 20 MG Tab TAKE ONE (1) TABLET BY MOUTH EVERY DAY FOR DEPRESSION  3   • rosuvastatin (CRESTOR) 20 MG Tab TAKE ONE (1) TABLET BY MOUTH AT BEDTIME FOR HIGH CHOLESTEROL LEVELS  2   • fexofenadine (ALLEGRA) 180 MG tablet TAKE ONE (1) TABLET BY MOUTH EVERY DAY FOR SEASONAL ALLERGIC RHINITIS  3   • aspirin (ASA) 325 MG Tab Take 325 mg by mouth every 6 hours as needed.       No current facility-administered medications on file prior to visit.        Patient has no known allergies.      ROS:   Review of Systems   Constitutional: Negative for chills, diaphoresis, fever, malaise/fatigue and weight loss.   HENT: Negative for congestion, ear discharge, ear pain, hearing loss, nosebleeds, sinus pain, sore throat and tinnitus.    Eyes: Negative for blurred vision, double vision, photophobia, pain, discharge and redness.   Respiratory: Negative for cough, hemoptysis, sputum production, shortness of breath, wheezing and stridor.    Cardiovascular: Negative  "for chest pain, palpitations, orthopnea, claudication, leg swelling and PND.   Gastrointestinal: Negative for abdominal pain, constipation, diarrhea, heartburn, nausea and vomiting.   Genitourinary: Negative for dysuria and urgency.   Musculoskeletal: Negative for back pain, falls, joint pain, myalgias and neck pain.   Skin: Negative for itching and rash.   Neurological: Negative for dizziness, tremors, speech change, focal weakness, weakness and headaches.   Endo/Heme/Allergies: Negative for environmental allergies.   Psychiatric/Behavioral: Negative for depression.       /66 (BP Location: Left arm, Patient Position: Sitting, BP Cuff Size: Adult)   Pulse (!) 50   Temp 36.4 °C (97.5 °F) (Temporal)   Resp 16   Ht 1.702 m (5' 7\")   Wt 94.3 kg (208 lb)   SpO2 96%   Physical Exam  Constitutional:       General: He is not in acute distress.     Appearance: Normal appearance. He is not diaphoretic.   HENT:      Head: Atraumatic.      Right Ear: External ear normal.      Left Ear: External ear normal.      Nose: Nose normal.      Mouth/Throat:      Mouth: Mucous membranes are moist.      Pharynx: Oropharynx is clear. No oropharyngeal exudate.   Eyes:      General: No scleral icterus.     Extraocular Movements: Extraocular movements intact.      Conjunctiva/sclera: Conjunctivae normal.      Pupils: Pupils are equal, round, and reactive to light.   Neck:      Musculoskeletal: Normal range of motion and neck supple.   Cardiovascular:      Rate and Rhythm: Normal rate and regular rhythm.      Heart sounds: Normal heart sounds. No murmur. No friction rub. No gallop.    Pulmonary:      Effort: Pulmonary effort is normal. No respiratory distress.      Breath sounds: Normal breath sounds. No wheezing or rales.   Abdominal:      General: There is no distension.      Palpations: Abdomen is soft.   Musculoskeletal:      Right lower leg: No edema.      Left lower leg: No edema.   Skin:     General: Skin is warm and dry.    "   Findings: No rash.   Neurological:      Mental Status: He is alert and oriented to person, place, and time.      Cranial Nerves: No cranial nerve deficit.   Psychiatric:         Mood and Affect: Mood normal.         Behavior: Behavior normal.         Imagaing as reviewed by me personally:  As per HPI    Assessment:  1. Lung nodules  CT-CHEST (THORAX) W/O   2. Need for vaccination  Pneumococcal Conjugate Vaccine 13-Valent       Plan:  1.  As discussed at our last clinic appointment and in my previous note, there is a possibility that we had some sampling error with regards to his lymph nodes but even in the presence of malignancy it would be a slow-growing process given minimal FDG uptake on PET scan.  Sarcoidosis remains a possibility as well as low-grade infection or other pneumoconiosis.  The patient has remained asymptomatic with no evidence of progression and disease on surveillance imaging.  We discussed continued observation with follow-up imaging in 6 to 12 months or sooner if he is to develop symptoms.  We will see the patient back with repeat CT chest in 9 months.  2.  Patient given Prevnar 13 as first in the series of pneumococcal vaccination today.  Return in about 9 months (around 8/21/2020) for ct chest.

## 2020-08-05 ENCOUNTER — TELEPHONE (OUTPATIENT)
Dept: PULMONOLOGY | Facility: HOSPICE | Age: 75
End: 2020-08-05

## 2020-08-05 NOTE — TELEPHONE ENCOUNTER
Pt scheduled appt to [facility] with Dr. Hernandez, wife wants to confirm if Ct scan or any imaging needed? I informed wife Patrizia per last ov 11/21/2019 with Dr. Hernandez to complete Ct scan in November which was completed 11/21/2019. Informed Dr. Hernandez not in clinic this week will forward. If Dr. Hernandez request new imaging I will contact to inform.

## 2020-08-10 NOTE — TELEPHONE ENCOUNTER
Isabel Hernandez M.D.  You Yesterday (8:58 AM)     I ordered CT 9 months from 11/19 but has not been done yet. Have they scheduled?    Message text

## 2020-08-10 NOTE — TELEPHONE ENCOUNTER
Left vm to spouse informed per Dr. Hernandez order for ct was placed in November to complete in 9 months ( NOW). Encouraged to call back and confirm location of study to complete. If outside of [facility] will fax order to preference location.

## 2020-08-24 ENCOUNTER — HOSPITAL ENCOUNTER (OUTPATIENT)
Dept: RADIOLOGY | Facility: MEDICAL CENTER | Age: 75
End: 2020-08-24
Payer: MEDICARE

## 2020-08-25 ENCOUNTER — TELEPHONE (OUTPATIENT)
Dept: PULMONOLOGY | Facility: HOSPICE | Age: 75
End: 2020-08-25

## 2020-08-25 ENCOUNTER — OFFICE VISIT (OUTPATIENT)
Dept: PULMONOLOGY | Facility: HOSPICE | Age: 75
End: 2020-08-25
Payer: MEDICARE

## 2020-08-25 VITALS
SYSTOLIC BLOOD PRESSURE: 110 MMHG | RESPIRATION RATE: 16 BRPM | DIASTOLIC BLOOD PRESSURE: 64 MMHG | OXYGEN SATURATION: 95 % | BODY MASS INDEX: 33.05 KG/M2 | HEART RATE: 56 BPM | WEIGHT: 211 LBS | TEMPERATURE: 97.5 F

## 2020-08-25 DIAGNOSIS — E66.9 CLASS 1 OBESITY WITH BODY MASS INDEX (BMI) OF 33.0 TO 33.9 IN ADULT, UNSPECIFIED OBESITY TYPE, UNSPECIFIED WHETHER SERIOUS COMORBIDITY PRESENT: ICD-10-CM

## 2020-08-25 DIAGNOSIS — R91.8 LUNG NODULES: ICD-10-CM

## 2020-08-25 PROCEDURE — 99213 OFFICE O/P EST LOW 20 MIN: CPT | Performed by: INTERNAL MEDICINE

## 2020-08-25 RX ORDER — TADALAFIL 5 MG/1
5 TABLET ORAL PRN
COMMUNITY

## 2020-08-25 ASSESSMENT — ENCOUNTER SYMPTOMS
DIZZINESS: 0
CONSTIPATION: 0
EYE PAIN: 0
SINUS PAIN: 0
SPUTUM PRODUCTION: 0
MYALGIAS: 0
ABDOMINAL PAIN: 0
VOMITING: 0
HEADACHES: 0
WHEEZING: 0
HEARTBURN: 0
NAUSEA: 0
DIAPHORESIS: 0
DOUBLE VISION: 0
DEPRESSION: 0
DIARRHEA: 0
FALLS: 0
WEIGHT LOSS: 0
CHILLS: 0
WEAKNESS: 0
TREMORS: 0
SHORTNESS OF BREATH: 0
SORE THROAT: 0
SPEECH CHANGE: 0
BLURRED VISION: 0
EYE REDNESS: 0
COUGH: 0
BACK PAIN: 0
FEVER: 0
EYE DISCHARGE: 0
PHOTOPHOBIA: 0
PND: 0
STRIDOR: 0
FOCAL WEAKNESS: 0
NECK PAIN: 0
HEMOPTYSIS: 0
PALPITATIONS: 0
CLAUDICATION: 0
ORTHOPNEA: 0

## 2020-08-25 NOTE — TELEPHONE ENCOUNTER
Per Ferdinand pt provided with Ct order. Pt will complete at [facility]    Order faxed to [facility]

## 2020-08-25 NOTE — PROGRESS NOTES
Chief Complaint   Patient presents with   • Pulmonary Nodule     last seen 11/21/19    • Results     CT [facility] 8/18/2020          HPI: This patient is a 75 y.o. male whom is followed in our clinic for pulmonary nodules last seen by me on 11/21/19.   The patient's past medical history significant for coronary artery disease status post coronary artery bypass graft roughly 25 years ago, major depressive disorder, hypertension, dyslipidemia.  The patient is a former tobacco user with less than 10-pack-year history and quit in 1980.   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  The patient was initially referred to me in July of 2019 for incidental finding of left upper lobe nodule on chest x-ray during a hospitalization for acute amnestic event and concern for stroke.  A follow-up CT chest was ordered and demonstrated 2.4 x 1.3 irregularly-shaped left upper lobe masslike consolidation in addition to a 9 mm right upper lobe pleural-based nodule.  He had mildly enlarged bilateral hilar lymph nodes measuring up to 1.4 cm in size.  A CT PET was ordered and demonstrated low-grade FDG uptake in lymph nodes and to a lesser extent the left upper lobe lesion.  No uptake in his right upper lobe nodule.  He did undergo navigational bronchoscopy with biopsy which showed some atypical cells but no malignant cells.  No evidence of infection. We discussed options including excisional bx, CT-guided needle bx vs observational management and pt opted for observation. I saw him last in November when he was asx with stable UGO spiculated nodule roughly 2 cm in maximum dimension and we planned to see him back in 9 mos with repeat CT. I have reviewed f/u CT from 8/18/20 at [facility] which shows stable UGO nodule estimated at 1.7 mm x 1.2 mm and stable RML nodule at  9 mm. No LAD or new nodules. Pt remains asx.     Past Medical  History:   Diagnosis Date   • Chickenpox    • Coronary heart disease    • Depression    • [place] measles    • Glaucoma     Bilateral eyes   • Heart attack (HCC)     CABG. Cardiology is Dr Garsia with [facility]   • High cholesterol    • Hypertension    • Lung nodule seen on imaging study    • Mumps    • Stroke (HCC) 2019    TIA-Negative work up and no residual       Social History     Socioeconomic History   • Marital status:      Spouse name: Not on file   • Number of children: Not on file   • Years of education: Not on file   • Highest education level: Not on file   Occupational History   • Not on file   Social Needs   • Financial resource strain: Not on file   • Food insecurity     Worry: Not on file     Inability: Not on file   • Transportation needs     Medical: Not on file     Non-medical: Not on file   Tobacco Use   • Smoking status: Former Smoker     Packs/day: 0.20     Years: 10.00     Pack years: 2.00     Types: Cigarettes     Start date:      Quit date:      Years since quittin.6   • Smokeless tobacco: Former User     Types: Chew     Quit date:    • Tobacco comment: tabacco chewer for 5 years    Substance and Sexual Activity   • Alcohol use: Not Currently   • Drug use: No   • Sexual activity: Not on file   Lifestyle   • Physical activity     Days per week: Not on file     Minutes per session: Not on file   • Stress: Not on file   Relationships   • Social connections     Talks on phone: Not on file     Gets together: Not on file     Attends Christian service: Not on file     Active member of club or organization: Not on file     Attends meetings of clubs or organizations: Not on file     Relationship status: Not on file   • Intimate partner violence     Fear of current or ex partner: Not on file     Emotionally abused: Not on file     Physically abused: Not on file     Forced sexual activity: Not on file   Other Topics Concern   • Not on file   Social History  Narrative   • Not on file       Family History   Problem Relation Age of Onset   • Cancer Mother    • No Known Problems Father    • Cancer Brother        Current Outpatient Medications on File Prior to Visit   Medication Sig Dispense Refill   • tadalafil (CIALIS) 5 MG tablet Take 5 mg by mouth as needed for Erectile Dysfunction.     • Probiotic Product (PROBIOTIC DAILY) Cap Take 2 Caps by mouth every day.     • glucosamine 500 MG Cap Take 1 Cap by mouth 2 Times a Day.     • latanoprost (XALATAN) 0.005 % Solution PUT 1 DROP IN BOTH EYES AT BEDTIME FOR GLAUCOMA  2   • lisinopril (PRINIVIL, ZESTRIL) 40 MG tablet TAKE ONE (1) TABLET EVERY DAY FOR HIGH BLOOD PRESSURE AND KIDNEYS  2   • metoprolol SR (TOPROL XL) 50 MG TABLET SR 24 HR Take 50 mg by mouth every day.  2   • omega-3 acid ethyl esters (LOVAZA) 1 GM capsule TAKE ONE (1) CAPSULE BY MOUTH TWICE A DAY  2   • PARoxetine (PAXIL) 20 MG Tab TAKE ONE (1) TABLET BY MOUTH EVERY DAY FOR DEPRESSION  3   • rosuvastatin (CRESTOR) 20 MG Tab TAKE ONE (1) TABLET BY MOUTH AT BEDTIME FOR HIGH CHOLESTEROL LEVELS  2   • fexofenadine (ALLEGRA) 180 MG tablet TAKE ONE (1) TABLET BY MOUTH EVERY DAY FOR SEASONAL ALLERGIC RHINITIS  3   • aspirin (ASA) 325 MG Tab Take 325 mg by mouth every 6 hours as needed.       No current facility-administered medications on file prior to visit.        Patient has no known allergies.      ROS:   Review of Systems   Constitutional: Negative for chills, diaphoresis, fever, malaise/fatigue and weight loss.   HENT: Negative for congestion, ear discharge, ear pain, hearing loss, nosebleeds, sinus pain, sore throat and tinnitus.    Eyes: Negative for blurred vision, double vision, photophobia, pain, discharge and redness.   Respiratory: Negative for cough, hemoptysis, sputum production, shortness of breath, wheezing and stridor.    Cardiovascular: Negative for chest pain, palpitations, orthopnea, claudication, leg swelling and PND.   Gastrointestinal:  Negative for abdominal pain, constipation, diarrhea, heartburn, nausea and vomiting.   Genitourinary: Negative for dysuria and urgency.   Musculoskeletal: Negative for back pain, falls, joint pain, myalgias and neck pain.   Skin: Negative for itching and rash.   Neurological: Negative for dizziness, tremors, speech change, focal weakness, weakness and headaches.   Endo/Heme/Allergies: Negative for environmental allergies.   Psychiatric/Behavioral: Negative for depression.       /64 (BP Location: Left arm, Patient Position: Sitting, BP Cuff Size: Large adult)   Pulse (!) 56   Temp 36.4 °C (97.5 °F) (Temporal)   Resp 16   Wt 95.7 kg (211 lb)   SpO2 95%   Physical Exam  Vitals signs reviewed.   Constitutional:       General: He is not in acute distress.     Appearance: Normal appearance. He is obese.   HENT:      Head: Normocephalic and atraumatic.      Right Ear: External ear normal.      Left Ear: External ear normal.      Nose: Nose normal. No congestion.      Mouth/Throat:      Mouth: Mucous membranes are moist.      Pharynx: Oropharynx is clear. No oropharyngeal exudate.   Eyes:      General: No scleral icterus.     Extraocular Movements: Extraocular movements intact.      Conjunctiva/sclera: Conjunctivae normal.      Pupils: Pupils are equal, round, and reactive to light.   Neck:      Musculoskeletal: Normal range of motion and neck supple.   Cardiovascular:      Rate and Rhythm: Normal rate and regular rhythm.      Heart sounds: Normal heart sounds. No murmur. No gallop.    Pulmonary:      Effort: Pulmonary effort is normal. No respiratory distress.      Breath sounds: Normal breath sounds. No wheezing or rales.   Abdominal:      General: There is no distension.      Palpations: Abdomen is soft.   Musculoskeletal: Normal range of motion.      Right lower leg: No edema.      Left lower leg: No edema.   Skin:     General: Skin is warm and dry.      Findings: No rash.   Neurological:      Mental Status:  He is alert and oriented to person, place, and time.      Cranial Nerves: No cranial nerve deficit.   Psychiatric:         Mood and Affect: Mood normal.         Behavior: Behavior normal.         PFTs as reviewed by me personally: as per hPI    Imaging as reviewed by me personally:  As per hPI    Assessment:  1. Lung nodules  CT-CHEST (THORAX) W/O   2. Class 1 obesity with body mass index (BMI) of 33.0 to 33.9 in adult, unspecified obesity type, unspecified whether serious comorbidity present         Plan:  1. Concerning for low grade malignancy however has shown no growth in the past year. We will plan to continue observation and f/u in one year with repeat CT chest at that time or sooner if sxs develop.   2. This does put patient at increased risk for obesity related pulmonary complications.  Encouraged healthy lifestyle habits.  Return in about 1 year (around 8/25/2021) for ct chest.

## (undated) DEVICE — ELECTRODE 850 FOAM ADHESIVE - HYDROGEL RADIOTRNSPRNT (50/PK)

## (undated) DEVICE — TOWELS CLOTH SURGICAL - (4/PK 20PK/CA)

## (undated) DEVICE — NEPTUNE 4 PORT MANIFOLD - (20/PK)

## (undated) DEVICE — GOWN SURGEONS X-LARGE - DISP. (30/CA)

## (undated) DEVICE — SYRINGE 20 ML LS (48/BX 4BX/CA)

## (undated) DEVICE — CANNULA W/ SUPPLY TUBING O2 - (50/CA)

## (undated) DEVICE — SPONGE GAUZE NON-STERILE 4X4 - (2000/CA 10PK/CA)

## (undated) DEVICE — KIT  I.V. START (100EA/CA)

## (undated) DEVICE — SYRINGE SAFETY 3 ML 18 GA X 1 1/2 BLUNT LL (100/BX 8BX/CA)

## (undated) DEVICE — GLOVE, LITE (PAIR)

## (undated) DEVICE — GLOVE BIOGEL SZ 8 SURGICAL PF LTX - (50PR/BX 4BX/CA)

## (undated) DEVICE — SENSOR SPO2 NEO LNCS ADHESIVE (20/BX) SEE USER NOTES

## (undated) DEVICE — TUBING CLEARLINK DUO-VENT - C-FLO (48EA/CA)

## (undated) DEVICE — TUBE CONNECTING SUCTION - CLEAR PLASTIC STERILE 72 IN (50EA/CA)